# Patient Record
Sex: MALE | Race: BLACK OR AFRICAN AMERICAN | NOT HISPANIC OR LATINO | Employment: UNEMPLOYED | URBAN - METROPOLITAN AREA
[De-identification: names, ages, dates, MRNs, and addresses within clinical notes are randomized per-mention and may not be internally consistent; named-entity substitution may affect disease eponyms.]

---

## 2023-10-31 ENCOUNTER — TELEPHONE (OUTPATIENT)
Dept: GASTROENTEROLOGY | Facility: HOSPITAL | Age: 63
End: 2023-10-31
Payer: MEDICARE

## 2023-11-05 PROBLEM — Z87.898 HISTORY OF DRY MOUTH: Status: ACTIVE | Noted: 2023-11-05

## 2023-11-05 PROBLEM — R63.4 WEIGHT LOSS, UNINTENTIONAL: Status: ACTIVE | Noted: 2023-11-05

## 2023-11-05 PROBLEM — G89.29 CHRONIC LEFT SHOULDER PAIN: Status: ACTIVE | Noted: 2023-11-05

## 2023-11-05 PROBLEM — K21.9 GERD (GASTROESOPHAGEAL REFLUX DISEASE): Status: ACTIVE | Noted: 2023-11-05

## 2023-11-05 PROBLEM — F10.20 ALCOHOLISM, CHRONIC (MULTI): Status: ACTIVE | Noted: 2023-11-05

## 2023-11-05 PROBLEM — R91.8 ABNORMAL CT LUNG SCREENING: Status: ACTIVE | Noted: 2023-11-05

## 2023-11-05 PROBLEM — N52.9 MALE ERECTILE DISORDER: Status: ACTIVE | Noted: 2023-11-05

## 2023-11-05 PROBLEM — K58.0 IRRITABLE BOWEL SYNDROME WITH DIARRHEA: Status: ACTIVE | Noted: 2023-11-05

## 2023-11-05 PROBLEM — E16.2 HYPOGLYCEMIA: Status: ACTIVE | Noted: 2023-11-05

## 2023-11-05 PROBLEM — G47.00 INSOMNIA: Status: ACTIVE | Noted: 2023-11-05

## 2023-11-05 PROBLEM — D12.6 TUBULAR ADENOMA OF COLON: Status: ACTIVE | Noted: 2023-11-05

## 2023-11-05 PROBLEM — E05.90 HYPERTHYROIDISM: Status: ACTIVE | Noted: 2023-11-05

## 2023-11-05 PROBLEM — R31.29 HEMATURIA, MICROSCOPIC: Status: ACTIVE | Noted: 2023-11-05

## 2023-11-05 PROBLEM — R20.2 PARESTHESIA: Status: ACTIVE | Noted: 2023-11-05

## 2023-11-05 PROBLEM — K52.9 CHRONIC DIARRHEA OF UNKNOWN ORIGIN: Status: ACTIVE | Noted: 2023-11-05

## 2023-11-05 PROBLEM — H92.02 OTALGIA, LEFT: Status: ACTIVE | Noted: 2023-11-05

## 2023-11-05 PROBLEM — H43.812 PVD (POSTERIOR VITREOUS DETACHMENT), LEFT EYE: Status: ACTIVE | Noted: 2023-11-05

## 2023-11-05 PROBLEM — H61.23 BILATERAL IMPACTED CERUMEN: Status: ACTIVE | Noted: 2023-11-05

## 2023-11-05 PROBLEM — H52.4 PRESBYOPIA: Status: ACTIVE | Noted: 2023-11-05

## 2023-11-05 PROBLEM — R22.0 PALATE MASS: Status: ACTIVE | Noted: 2023-11-05

## 2023-11-05 PROBLEM — M25.512 CHRONIC LEFT SHOULDER PAIN: Status: ACTIVE | Noted: 2023-11-05

## 2023-11-05 PROBLEM — H90.11 CONDUCTIVE HEARING LOSS OF RIGHT EAR WITH UNRESTRICTED HEARING OF LEFT EAR: Status: ACTIVE | Noted: 2023-11-05

## 2023-11-05 PROBLEM — H54.7 VISION PROBLEMS: Status: ACTIVE | Noted: 2023-11-05

## 2023-11-05 RX ORDER — LEVETIRACETAM 500 MG/1
500 TABLET ORAL 2 TIMES DAILY
COMMUNITY
Start: 2023-08-14

## 2023-11-05 RX ORDER — POLYETHYLENE GLYCOL 3350 17 G/17G
POWDER, FOR SOLUTION ORAL
COMMUNITY
Start: 2021-03-23 | End: 2023-11-07

## 2023-11-05 RX ORDER — ACETAMINOPHEN, DIPHENHYDRAMINE HCL, PHENYLEPHRINE HCL 325; 25; 5 MG/1; MG/1; MG/1
1 TABLET ORAL
COMMUNITY
Start: 2020-02-04 | End: 2023-11-07

## 2023-11-05 RX ORDER — TALC
POWDER (GRAM) TOPICAL NIGHTLY
COMMUNITY
Start: 2021-11-09 | End: 2023-11-07

## 2023-11-05 RX ORDER — IBUPROFEN 200 MG
TABLET ORAL
COMMUNITY
Start: 2021-11-11

## 2023-11-05 RX ORDER — FAMOTIDINE 20 MG/1
20 TABLET, FILM COATED ORAL
COMMUNITY
Start: 2021-03-23 | End: 2023-11-07

## 2023-11-07 ENCOUNTER — PHARMACY VISIT (OUTPATIENT)
Dept: PHARMACY | Facility: CLINIC | Age: 63
End: 2023-11-07
Payer: COMMERCIAL

## 2023-11-07 ENCOUNTER — OFFICE VISIT (OUTPATIENT)
Dept: GASTROENTEROLOGY | Facility: HOSPITAL | Age: 63
End: 2023-11-07
Payer: MEDICARE

## 2023-11-07 VITALS
DIASTOLIC BLOOD PRESSURE: 70 MMHG | HEART RATE: 88 BPM | BODY MASS INDEX: 19.16 KG/M2 | RESPIRATION RATE: 18 BRPM | HEIGHT: 65 IN | OXYGEN SATURATION: 99 % | SYSTOLIC BLOOD PRESSURE: 109 MMHG | TEMPERATURE: 97.3 F | WEIGHT: 115 LBS

## 2023-11-07 DIAGNOSIS — D12.6 TUBULAR ADENOMA OF COLON: ICD-10-CM

## 2023-11-07 DIAGNOSIS — R63.0 POOR APPETITE: ICD-10-CM

## 2023-11-07 DIAGNOSIS — R11.2 NAUSEA AND VOMITING, UNSPECIFIED VOMITING TYPE: Primary | ICD-10-CM

## 2023-11-07 DIAGNOSIS — K58.0 IRRITABLE BOWEL SYNDROME WITH DIARRHEA: ICD-10-CM

## 2023-11-07 PROCEDURE — RXMED WILLOW AMBULATORY MEDICATION CHARGE

## 2023-11-07 PROCEDURE — 99214 OFFICE O/P EST MOD 30 MIN: CPT | Performed by: NURSE PRACTITIONER

## 2023-11-07 RX ORDER — PSYLLIUM HUSK 0.4 G
5 CAPSULE ORAL 3 TIMES DAILY
Qty: 90 CAPSULE | Refills: 2 | Status: SHIPPED | OUTPATIENT
Start: 2023-11-07

## 2023-11-07 RX ORDER — ONDANSETRON 4 MG/1
4 TABLET, ORALLY DISINTEGRATING ORAL EVERY 6 HOURS PRN
Qty: 120 TABLET | Refills: 2 | Status: SHIPPED | OUTPATIENT
Start: 2023-11-07 | End: 2024-11-06

## 2023-11-07 RX ORDER — LOPERAMIDE HYDROCHLORIDE 2 MG/1
4 CAPSULE ORAL 4 TIMES DAILY PRN
Qty: 120 CAPSULE | Refills: 5 | Status: SHIPPED | OUTPATIENT
Start: 2023-11-07 | End: 2024-11-06

## 2023-11-07 SDOH — ECONOMIC STABILITY: FOOD INSECURITY: WITHIN THE PAST 12 MONTHS, THE FOOD YOU BOUGHT JUST DIDN'T LAST AND YOU DIDN'T HAVE MONEY TO GET MORE.: NEVER TRUE

## 2023-11-07 SDOH — ECONOMIC STABILITY: FOOD INSECURITY: WITHIN THE PAST 12 MONTHS, YOU WORRIED THAT YOUR FOOD WOULD RUN OUT BEFORE YOU GOT MONEY TO BUY MORE.: NEVER TRUE

## 2023-11-07 ASSESSMENT — ENCOUNTER SYMPTOMS
SORE THROAT: 0
OCCASIONAL FEELINGS OF UNSTEADINESS: 0
COUGH: 0
JOINT SWELLING: 0
NERVOUS/ANXIOUS: 0
DIZZINESS: 0
MYALGIAS: 0
FATIGUE: 0
HEMATURIA: 0
WEAKNESS: 0
WHEEZING: 0
SHORTNESS OF BREATH: 0
EYE PAIN: 0
NUMBNESS: 0
FEVER: 0
DIAPHORESIS: 0
LIGHT-HEADEDNESS: 0
CHILLS: 0
DEPRESSION: 0
PHOTOPHOBIA: 0
AGITATION: 0
ARTHRALGIAS: 0
DYSURIA: 0
BACK PAIN: 0
LOSS OF SENSATION IN FEET: 0
ADENOPATHY: 0
FLANK PAIN: 0
HALLUCINATIONS: 0
PALPITATIONS: 0

## 2023-11-07 ASSESSMENT — COLUMBIA-SUICIDE SEVERITY RATING SCALE - C-SSRS
1. IN THE PAST MONTH, HAVE YOU WISHED YOU WERE DEAD OR WISHED YOU COULD GO TO SLEEP AND NOT WAKE UP?: NO
6. HAVE YOU EVER DONE ANYTHING, STARTED TO DO ANYTHING, OR PREPARED TO DO ANYTHING TO END YOUR LIFE?: NO
2. HAVE YOU ACTUALLY HAD ANY THOUGHTS OF KILLING YOURSELF?: NO

## 2023-11-07 ASSESSMENT — PATIENT HEALTH QUESTIONNAIRE - PHQ9
2. FEELING DOWN, DEPRESSED OR HOPELESS: NOT AT ALL
SUM OF ALL RESPONSES TO PHQ9 QUESTIONS 1 AND 2: 0
1. LITTLE INTEREST OR PLEASURE IN DOING THINGS: NOT AT ALL

## 2023-11-07 ASSESSMENT — PAIN SCALES - GENERAL: PAINLEVEL: 0-NO PAIN

## 2023-11-07 NOTE — PROGRESS NOTES
Subjective   Patient ID: Andrew Ramírez is a 63 y.o. male who presents for Follow-up.    This is a  63 year old AAM with history of tobacco/daily alcohol use, stage IIIb lung adenocarcinoma ([facility]) with brain metastasis s/p resection (2/2023 [facility]) and chemotherapy/radiation, GERD, chronic diarrhea, and adenomatous colon polyps who is presenting to the GI clinic for follow up. Last seen in the GI clinic on 6/13/23.     Of note, he did not complete the surveillance colonoscopy which was ordered at the last visit     History per pt and review of EMR    Pt smokes 1 PPD of cigarettes. States that he drinks three 24 ounces cans of beer almost every day. Denies use of illicit drugs.     Report abdominal cramping and intermittent mushy diarrhea up to 3 times per day. Some days stools are formed. Endorses relief with loperamide and psyllium husk and is asking for refills of both.     Reports continued episodic nausea/vomiting which is controlled with PRN use of ondansetron. He is nearly out of ondansetron and is asking for a refill.     He questions if he may be drinking too much beer in relation to his digestive health.     Denies dysphagia, heartburn, constipation, hematemesis, hematochezia, or melena.     He endorses a poor appetite, but he did have relief with Boost supplements. He would like a prescription for Boost supplements.             Review of Systems   Constitutional:  Negative for chills, diaphoresis, fatigue and fever.   HENT:  Negative for congestion, ear pain, hearing loss, sneezing and sore throat.    Eyes:  Negative for photophobia, pain and visual disturbance.   Respiratory:  Negative for cough, shortness of breath and wheezing.    Cardiovascular:  Negative for chest pain, palpitations and leg swelling.   Endocrine: Negative for cold intolerance and heat intolerance.   Genitourinary:  Negative for dysuria, flank pain, hematuria and urgency.   Musculoskeletal:  Negative for arthralgias, back pain, gait  "problem, joint swelling and myalgias.   Skin:  Negative for rash.   Neurological:  Negative for dizziness, syncope, weakness, light-headedness and numbness.   Hematological:  Negative for adenopathy.   Psychiatric/Behavioral:  Negative for agitation and hallucinations. The patient is not nervous/anxious.        /70   Pulse 88   Temp 36.3 °C (97.3 °F)   Resp 18   Ht 1.651 m (5' 5\")   Wt 52.2 kg (115 lb)   SpO2 99%   BMI 19.14 kg/m²      Objective     No Known Allergies    Physical Exam  Constitutional:       General: He is not in acute distress.     Appearance: Normal appearance.   HENT:      Head: Normocephalic and atraumatic.      Mouth/Throat:      Mouth: Mucous membranes are moist.   Eyes:      Conjunctiva/sclera: Conjunctivae normal.   Cardiovascular:      Rate and Rhythm: Normal rate and regular rhythm.      Heart sounds: No murmur heard.     No gallop.   Pulmonary:      Effort: Pulmonary effort is normal.      Breath sounds: Normal breath sounds.   Abdominal:      General: Bowel sounds are normal. There is no distension.      Tenderness: There is no abdominal tenderness. There is no guarding.   Musculoskeletal:         General: No swelling or deformity. Normal range of motion.      Cervical back: Normal range of motion. No rigidity.   Skin:     General: Skin is warm and dry.      Coloration: Skin is not jaundiced.      Findings: No lesion or rash.   Neurological:      General: No focal deficit present.      Mental Status: He is alert and oriented to person, place, and time.   Psychiatric:         Mood and Affect: Mood normal.         Assessment/Plan   Problem List Items Addressed This Visit       Irritable bowel syndrome with diarrhea    Relevant Medications    loperamide (Imodium) 2 mg capsule    psyllium (Metamucil) 0.4 gram capsule    Tubular adenoma of colon     Other Visit Diagnoses       Nausea and vomiting, unspecified vomiting type    -  Primary    Relevant Medications    ondansetron ODT " (Zofran-ODT) 4 mg disintegrating tablet    Poor appetite        Relevant Orders    Referral to Nutrition Services           1. IBS-D: not well controlled  - refill loperamide as needed per pt reqeust  - refill psyllium fiber per pt request; advise pt to  OTC psyllium fiber if his insurance won't cover it       2. Nausea/vomiting: controlled with ondansetron, but nearly out. Question gastritis vs GERD in the setting of regular use of alcohol and tobacco.   - refill ondansetron as needed per pt request   - reinforce tobacco cessation and cutting back on alcohol intake     3. History of tubular adenomas:   - declining surveillance colonoscopy at this time     4. Poor appetite:  - refer to nutrition for consideration of oral nutritional supplements such as Boost     5. Follow up:  - return to clinic in 1 year

## 2023-11-07 NOTE — PATIENT INSTRUCTIONS
Thanks for coming to the GI clinic.     I am referring you to a nutritionist to discuss Boost supplements. Please call [phone] to schedule.     I recommend a colonoscopy given your history of colon polyps, but I understand you do not want to complete this at this time.     You can use loperamide as needed for diarrhea (I refilled this).     You can use ondansetron for nausea/vomiting (I refilled this).     Start psyllium fiber capsules for diarrhea. I sent a prescription. This can be purchased OTC if your insurance does not cover it.     Try to stop using tobacco.     Try cut cut back on alcohol intake.     Follow up in 1 year.

## 2024-01-09 ENCOUNTER — APPOINTMENT (OUTPATIENT)
Dept: GASTROENTEROLOGY | Facility: HOSPITAL | Age: 64
End: 2024-01-09
Payer: COMMERCIAL

## 2024-01-31 ENCOUNTER — APPOINTMENT (OUTPATIENT)
Dept: NUTRITION | Facility: HOSPITAL | Age: 64
End: 2024-01-31
Payer: COMMERCIAL